# Patient Record
Sex: FEMALE | Race: WHITE | NOT HISPANIC OR LATINO | URBAN - METROPOLITAN AREA
[De-identification: names, ages, dates, MRNs, and addresses within clinical notes are randomized per-mention and may not be internally consistent; named-entity substitution may affect disease eponyms.]

---

## 2020-07-25 ENCOUNTER — TELEPHONE ENCOUNTER (OUTPATIENT)
Dept: URBAN - METROPOLITAN AREA CLINIC 13 | Facility: CLINIC | Age: 70
End: 2020-07-25

## 2020-07-25 RX ORDER — POLYETHYLENE GLYCOL 3350, SODIUM SULFATE, SODIUM CHLORIDE, POTASSIUM CHLORIDE, ASCORBIC ACID, SODIUM ASCORBATE 7.5-2.691G
TAKE 32 OZ AS DIRECTED 5:00PM THE EVENING BEFORE AND 6HR PRIOR TO PROCEDURE KIT ORAL
Qty: 1 | Refills: 0 | OUTPATIENT
Start: 2016-09-19 | End: 2016-09-29

## 2020-07-26 ENCOUNTER — TELEPHONE ENCOUNTER (OUTPATIENT)
Dept: URBAN - METROPOLITAN AREA CLINIC 13 | Facility: CLINIC | Age: 70
End: 2020-07-26

## 2020-07-26 RX ORDER — CALCIUM CITRATE/VITAMIN D3 200MG-6.25
TAKE 1 TABLET DAILY UNSURE OF DOSAGE TABLET ORAL
Refills: 0 | Status: ACTIVE | COMMUNITY

## 2020-07-26 RX ORDER — MULTIVIT-MIN/FOLIC/VIT K/LYCOP 400-300MCG
TAKE 1 TABLET DAILY TABLET ORAL
Refills: 0 | Status: ACTIVE | COMMUNITY
Start: 2016-06-24

## 2020-07-26 RX ORDER — FOLIC ACID 0.8 MG
TAKE 1 CAPSULE DAILY TABLET ORAL
Refills: 0 | Status: ACTIVE | COMMUNITY
Start: 2016-06-24

## 2020-07-26 RX ORDER — BISOPROLOL FUMARATE 5 MG/1
TAKE 1/2 TAB EVERY AM AND 1 TAB EVERY PM TABLET, FILM COATED ORAL
Refills: 0 | Status: ACTIVE | COMMUNITY
Start: 2016-06-24

## 2020-07-26 RX ORDER — PERPHENAZINE/AMITRIPTYLINE HCL 2 MG-25 MG
TAKE 6 TABLET WEEKLY 1TAB SUN,MON,WED,FRI ;  2 TABS TUES AND THURS TABLET ORAL
Refills: 0 | Status: ACTIVE | COMMUNITY
Start: 2016-06-24

## 2020-07-26 RX ORDER — MULTIVITAMIN
TAKE 1 TABLET DAILY TABLET ORAL
Refills: 0 | Status: ACTIVE | COMMUNITY
Start: 2016-06-24

## 2020-07-26 RX ORDER — SIMVASTATIN 5 MG/1
TAKE 1 TABLET AT BEDTIME TABLET, FILM COATED ORAL
Refills: 0 | Status: ACTIVE | COMMUNITY
Start: 2016-06-24

## 2020-07-26 RX ORDER — ESTRADIOL 0.05 MG/D
APPLY 1 PATCH TWICE WEEKLY AS DIRECTED PATCH, EXTENDED RELEASE TRANSDERMAL
Refills: 0 | Status: ACTIVE | COMMUNITY
Start: 2016-06-24

## 2020-07-26 RX ORDER — OMEGA-3/DHA/EPA/FISH OIL 1200 MG
TAKE 1 CAPSULE TWICE DAILY CAPSULE ORAL
Refills: 0 | Status: ACTIVE | COMMUNITY
Start: 2016-06-24

## 2023-11-30 ENCOUNTER — LAB OUTSIDE AN ENCOUNTER (OUTPATIENT)
Dept: URBAN - METROPOLITAN AREA CLINIC 113 | Facility: CLINIC | Age: 73
End: 2023-11-30

## 2023-11-30 ENCOUNTER — OFFICE VISIT (OUTPATIENT)
Dept: URBAN - METROPOLITAN AREA CLINIC 113 | Facility: CLINIC | Age: 73
End: 2023-11-30
Payer: MEDICARE

## 2023-11-30 VITALS
DIASTOLIC BLOOD PRESSURE: 78 MMHG | WEIGHT: 174 LBS | HEART RATE: 75 BPM | TEMPERATURE: 97.6 F | BODY MASS INDEX: 24.91 KG/M2 | RESPIRATION RATE: 16 BRPM | HEIGHT: 70 IN | SYSTOLIC BLOOD PRESSURE: 132 MMHG

## 2023-11-30 DIAGNOSIS — R19.4 CHANGE IN BOWEL HABITS: ICD-10-CM

## 2023-11-30 DIAGNOSIS — R10.84 GENERALIZED ABDOMINAL PAIN: ICD-10-CM

## 2023-11-30 PROCEDURE — 99205 OFFICE O/P NEW HI 60 MIN: CPT | Performed by: INTERNAL MEDICINE

## 2023-11-30 RX ORDER — PERPHENAZINE/AMITRIPTYLINE HCL 2 MG-25 MG
TAKE 6 TABLET WEEKLY 1TAB SUN,MON,WED,FRI ;  2 TABS TUES AND THURS TABLET ORAL
Refills: 0 | Status: ON HOLD | COMMUNITY
Start: 2016-06-24

## 2023-11-30 RX ORDER — ESTRADIOL 0.05 MG/D
APPLY 1 PATCH TWICE WEEKLY AS DIRECTED PATCH, EXTENDED RELEASE TRANSDERMAL
Refills: 0 | Status: ACTIVE | COMMUNITY
Start: 2016-06-24

## 2023-11-30 RX ORDER — ROPINIROLE 2 MG/1
1 TABLET TABLET, FILM COATED, EXTENDED RELEASE ORAL ONCE A DAY
Status: ACTIVE | COMMUNITY

## 2023-11-30 RX ORDER — BISOPROLOL FUMARATE 5 MG/1
TAKE 1/2 TAB EVERY AM AND 1 TAB EVERY PM TABLET, FILM COATED ORAL
Refills: 0 | Status: ACTIVE | COMMUNITY
Start: 2016-06-24

## 2023-11-30 RX ORDER — MONTELUKAST SODIUM 10 MG/1
1 TABLET TABLET, FILM COATED ORAL ONCE A DAY
Status: ACTIVE | COMMUNITY

## 2023-11-30 RX ORDER — FOLIC ACID 0.8 MG
TAKE 1 CAPSULE DAILY TABLET ORAL
Refills: 0 | Status: ACTIVE | COMMUNITY
Start: 2016-06-24

## 2023-11-30 RX ORDER — SIMVASTATIN 5 MG/1
TAKE 1 TABLET AT BEDTIME TABLET, FILM COATED ORAL
Refills: 0 | Status: ACTIVE | COMMUNITY
Start: 2016-06-24

## 2023-11-30 RX ORDER — MULTIVITAMIN
TAKE 1 TABLET DAILY TABLET ORAL
Refills: 0 | Status: ACTIVE | COMMUNITY
Start: 2016-06-24

## 2023-11-30 RX ORDER — CALCIUM CITRATE/VITAMIN D3 200MG-6.25
TAKE 1 TABLET DAILY UNSURE OF DOSAGE TABLET ORAL
Refills: 0 | Status: ACTIVE | COMMUNITY

## 2023-11-30 RX ORDER — MULTIVIT-MIN/FOLIC/VIT K/LYCOP 400-300MCG
TAKE 1 TABLET DAILY TABLET ORAL
Refills: 0 | Status: ACTIVE | COMMUNITY
Start: 2016-06-24

## 2023-11-30 RX ORDER — OMEGA-3/DHA/EPA/FISH OIL 1200 MG
TAKE 1 CAPSULE TWICE DAILY CAPSULE ORAL
Refills: 0 | Status: ACTIVE | COMMUNITY
Start: 2016-06-24

## 2023-11-30 NOTE — HPI-TODAY'S VISIT:
Elif Harvey is a 73-year-old female who presents as a new patient with a plethora of complaints.  I had a chance to review the results of some of the information sent by Dr. Ramos. The patient is a circuitous historian and the history is somewhat difficult to follow.  She dates the beginning of her symptoms to the time she got COVID initially a year and a half ago.  She had "long COVID" symptoms after that.  She had symptoms of bowel irregularity, headache, fatigue, a chronic cough, etc.  She then got COVID a second time in July 2023, and had a persistent cough after that, as well as worsening headaches for which she was treated with amitriptyline, nortriptyline, and a number of other medications.  The patient had a COVID-vaccine administered on October 21, 2023, and she tells me that she "felt fine" before that vaccine was given.  After that, she had worsening diarrhea which lasted for weeks.  She also had a decrease in appetite, and was "not eating much."  She typically would have loose stools in the morning, usually after breakfast, followed by semiformed stools later in the day.  The symptoms are associated with gurgling abdominal discomfort, and decreased appetite.  The patient had occasional regurgitation after eating, and ongoing fatigue complaints.  The symptoms have gradually gotten better since the vaccination, she now says 1 loose stool in the morning, with semiformed stools throughout the day with some associated fecal incontinence.  This still represents a change in bowel habits for her, as she is having 4-5 bowel movements per day which is more than her baseline of 1-2 bowel movements per day prior to her first bout of COVID a year and a half ago.  She has abdominal pain during her bowel movements and up for about a half an hour afterwards.  She has had 7 to 8 pounds of unintentional weight loss since that COVID vaccination in October 2023.  She has some questionable blood in her stool 10 days ago.  Her last colonoscopy was done by Dr. Preciado on September 29, 2016.  This showed a 6 mm rectosigmoid polyp which was histologically hyperplastic as well as internal hemorrhoids.  No diverticula were seen.  The patient was evaluated by Dr. Ramos for a number of different potential organic etiologies, with iron studies being done showing an iron saturation of 40%, a ferritin of 165, TSH of 1.91, a negative urinalysis, sodium 139, potassium 4.2, chloride of 102, bicarb 30, calcium 9.2, total protein 7.0, albumin 4.1, total bilirubin 0.6, alkaline phosphatase 50, AST 25, ALT 25, white blood cell count 4200, hemoglobin 14.6, hematocrit 43.7, and a platelet count of 161,000.  She tells me that she had testing done for rheumatoid arthritis, but I do not see any evidence of this.  Interestingly, the patient tells me that she had a post viral illness when she was a teenager after getting mono which lasted for several months. The patient also had some pink-tinged urine today x2.  This is a new phenomenon.  She denies any dysuria. There is no family history of Crohn's disease, ulcer colitis, etc.  There is apparently a family history of celiac sprue.

## 2023-11-30 NOTE — EXAM-FUNCTIONAL ASSESSMENT
General--no acute distress Eyes--anicteric HENT--normocephalic, atraumatic head Neck--no lymphadenopathy Chest--normal breath sounds Heart--regular rate and rhythm Abdomen--soft, non tender, non distended, bowel sounds present Musculoskeletal--normal gait and station Skin--no rashes Neurologic--Alert and oriented x 3 Psychiatric--stable mood, appropriate affect

## 2023-12-01 LAB
A/G RATIO: 1.7
ABSOLUTE BASOPHILS: 52
ABSOLUTE EOSINOPHILS: 94
ABSOLUTE LYMPHOCYTES: 1841
ABSOLUTE MONOCYTES: 614
ABSOLUTE NEUTROPHILS: 7800
ALBUMIN: 4.1
ALKALINE PHOSPHATASE: 52
ALT (SGPT): 13
AST (SGOT): 13
BASOPHILS: 0.5
BILIRUBIN, TOTAL: 0.5
BUN/CREATININE RATIO: (no result)
BUN: 14
C-REACTIVE PROTEIN, QUANT: 23.9
CALCIUM: 9.4
CARBON DIOXIDE, TOTAL: 29
CHLORIDE: 101
CREATININE: 0.78
EGFR: 81
EOSINOPHILS: 0.9
GLOBULIN, TOTAL: 2.4
GLUCOSE: 86
HEMATOCRIT: 39.5
HEMOGLOBIN: 13.5
IMMUNOGLOBULIN A: 118
LYMPHOCYTES: 17.7
MCH: 30.6
MCHC: 34.2
MCV: 89.6
MONOCYTES: 5.9
MPV: 11.5
NEUTROPHILS: 75
PLATELET COUNT: 173
POTASSIUM: 3.8
PROTEIN, TOTAL: 6.5
RDW: 12.2
RED BLOOD CELL COUNT: 4.41
SED RATE BY MODIFIED: 14
SODIUM: 139
TISSUE TRANSGLUTAMINASE AB, IGA: <1
WHITE BLOOD CELL COUNT: 10.4

## 2023-12-04 ENCOUNTER — TELEPHONE ENCOUNTER (OUTPATIENT)
Dept: URBAN - METROPOLITAN AREA CLINIC 113 | Facility: CLINIC | Age: 73
End: 2023-12-04

## 2023-12-11 ENCOUNTER — TELEPHONE ENCOUNTER (OUTPATIENT)
Dept: URBAN - METROPOLITAN AREA CLINIC 113 | Facility: CLINIC | Age: 73
End: 2023-12-11

## 2023-12-13 ENCOUNTER — TELEPHONE ENCOUNTER (OUTPATIENT)
Dept: URBAN - METROPOLITAN AREA CLINIC 113 | Facility: CLINIC | Age: 73
End: 2023-12-13

## 2023-12-18 LAB
APPEARANCE: CLEAR
BACTERIA: (no result)
BILIRUBIN: NEGATIVE
C-REACTIVE PROTEIN, QUANT: 2.5
GLUCOSE: NEGATIVE
HYALINE CAST: (no result)
KETONES: (no result)
NITRITE, URINE: NEGATIVE
OCCULT BLOOD: NEGATIVE
PH: 6.5
PROTEIN: NEGATIVE
RBC: (no result)
SED RATE BY MODIFIED: 6
SPECIFIC GRAVITY: 1.02
SQUAMOUS EPITHELIAL CELLS: (no result)
URINE-COLOR: YELLOW
WBC ESTERASE: NEGATIVE
WBC: (no result)

## 2024-01-09 ENCOUNTER — OFFICE VISIT (OUTPATIENT)
Dept: URBAN - METROPOLITAN AREA CLINIC 113 | Facility: CLINIC | Age: 74
End: 2024-01-09

## 2024-02-08 ENCOUNTER — COLON (OUTPATIENT)
Dept: URBAN - METROPOLITAN AREA SURGERY CENTER 25 | Facility: SURGERY CENTER | Age: 74
End: 2024-02-08
Payer: MEDICARE

## 2024-02-08 ENCOUNTER — LAB (OUTPATIENT)
Dept: URBAN - METROPOLITAN AREA CLINIC 4 | Facility: CLINIC | Age: 74
End: 2024-02-08
Payer: MEDICARE

## 2024-02-08 DIAGNOSIS — K63.89 OTHER SPECIFIED DISEASES OF INTESTINE: ICD-10-CM

## 2024-02-08 DIAGNOSIS — R10.84 ABDOMINAL CRAMPING, GENERALIZED: ICD-10-CM

## 2024-02-08 DIAGNOSIS — R19.7 ACUTE DIARRHEA: ICD-10-CM

## 2024-02-08 PROCEDURE — 45380 COLONOSCOPY AND BIOPSY: CPT | Performed by: INTERNAL MEDICINE

## 2024-02-08 PROCEDURE — 88313 SPECIAL STAINS GROUP 2: CPT | Performed by: PATHOLOGY

## 2024-02-08 PROCEDURE — 88342 IMHCHEM/IMCYTCHM 1ST ANTB: CPT | Performed by: PATHOLOGY

## 2024-02-08 PROCEDURE — 88305 TISSUE EXAM BY PATHOLOGIST: CPT | Performed by: PATHOLOGY

## 2024-02-08 RX ORDER — PERPHENAZINE/AMITRIPTYLINE HCL 2 MG-25 MG
TAKE 6 TABLET WEEKLY 1TAB SUN,MON,WED,FRI ;  2 TABS TUES AND THURS TABLET ORAL
Refills: 0 | Status: ON HOLD | COMMUNITY
Start: 2016-06-24

## 2024-02-08 RX ORDER — BISOPROLOL FUMARATE 5 MG/1
TAKE 1/2 TAB EVERY AM AND 1 TAB EVERY PM TABLET, FILM COATED ORAL
Refills: 0 | Status: ACTIVE | COMMUNITY
Start: 2016-06-24

## 2024-02-08 RX ORDER — ROPINIROLE 2 MG/1
1 TABLET TABLET, FILM COATED, EXTENDED RELEASE ORAL ONCE A DAY
Status: ACTIVE | COMMUNITY

## 2024-02-08 RX ORDER — CALCIUM CITRATE/VITAMIN D3 200MG-6.25
TAKE 1 TABLET DAILY UNSURE OF DOSAGE TABLET ORAL
Refills: 0 | Status: ACTIVE | COMMUNITY

## 2024-02-08 RX ORDER — SIMVASTATIN 5 MG/1
TAKE 1 TABLET AT BEDTIME TABLET, FILM COATED ORAL
Refills: 0 | Status: ACTIVE | COMMUNITY
Start: 2016-06-24

## 2024-02-08 RX ORDER — MULTIVITAMIN
TAKE 1 TABLET DAILY TABLET ORAL
Refills: 0 | Status: ACTIVE | COMMUNITY
Start: 2016-06-24

## 2024-02-08 RX ORDER — FOLIC ACID 0.8 MG
TAKE 1 CAPSULE DAILY TABLET ORAL
Refills: 0 | Status: ACTIVE | COMMUNITY
Start: 2016-06-24

## 2024-02-08 RX ORDER — MONTELUKAST SODIUM 10 MG/1
1 TABLET TABLET, FILM COATED ORAL ONCE A DAY
Status: ACTIVE | COMMUNITY

## 2024-02-08 RX ORDER — MULTIVIT-MIN/FOLIC/VIT K/LYCOP 400-300MCG
TAKE 1 TABLET DAILY TABLET ORAL
Refills: 0 | Status: ACTIVE | COMMUNITY
Start: 2016-06-24

## 2024-02-08 RX ORDER — OMEGA-3/DHA/EPA/FISH OIL 1200 MG
TAKE 1 CAPSULE TWICE DAILY CAPSULE ORAL
Refills: 0 | Status: ACTIVE | COMMUNITY
Start: 2016-06-24

## 2024-02-08 RX ORDER — ESTRADIOL 0.05 MG/D
APPLY 1 PATCH TWICE WEEKLY AS DIRECTED PATCH, EXTENDED RELEASE TRANSDERMAL
Refills: 0 | Status: ACTIVE | COMMUNITY
Start: 2016-06-24

## 2024-03-08 ENCOUNTER — LAB (OUTPATIENT)
Dept: URBAN - METROPOLITAN AREA CLINIC 113 | Facility: CLINIC | Age: 74
End: 2024-03-08

## 2024-03-08 ENCOUNTER — OV EP (OUTPATIENT)
Dept: URBAN - METROPOLITAN AREA CLINIC 113 | Facility: CLINIC | Age: 74
End: 2024-03-08
Payer: MEDICARE

## 2024-03-08 VITALS
WEIGHT: 177 LBS | SYSTOLIC BLOOD PRESSURE: 138 MMHG | HEIGHT: 70 IN | BODY MASS INDEX: 25.34 KG/M2 | TEMPERATURE: 98.6 F | HEART RATE: 76 BPM | DIASTOLIC BLOOD PRESSURE: 74 MMHG | RESPIRATION RATE: 16 BRPM

## 2024-03-08 DIAGNOSIS — R10.84 GENERALIZED ABDOMINAL PAIN: ICD-10-CM

## 2024-03-08 DIAGNOSIS — R19.4 CHANGE IN BOWEL HABITS: ICD-10-CM

## 2024-03-08 DIAGNOSIS — R11.0 NAUSEA: ICD-10-CM

## 2024-03-08 PROCEDURE — 99214 OFFICE O/P EST MOD 30 MIN: CPT | Performed by: INTERNAL MEDICINE

## 2024-03-08 RX ORDER — CALCIUM CITRATE/VITAMIN D3 200MG-6.25
TAKE 1 TABLET DAILY UNSURE OF DOSAGE TABLET ORAL
Refills: 0 | Status: ACTIVE | COMMUNITY

## 2024-03-08 RX ORDER — BISOPROLOL FUMARATE 5 MG/1
TAKE 1/2 TAB EVERY AM AND 1 TAB EVERY PM TABLET, FILM COATED ORAL
Refills: 0 | Status: ACTIVE | COMMUNITY
Start: 2016-06-24

## 2024-03-08 RX ORDER — SIMVASTATIN 5 MG/1
TAKE 1 TABLET AT BEDTIME TABLET, FILM COATED ORAL
Refills: 0 | Status: ACTIVE | COMMUNITY
Start: 2016-06-24

## 2024-03-08 RX ORDER — PERPHENAZINE/AMITRIPTYLINE HCL 2 MG-25 MG
TAKE 6 TABLET WEEKLY 1TAB SUN,MON,WED,FRI ;  2 TABS TUES AND THURS TABLET ORAL
Refills: 0 | Status: ON HOLD | COMMUNITY
Start: 2016-06-24

## 2024-03-08 RX ORDER — MONTELUKAST SODIUM 10 MG/1
1 TABLET TABLET, FILM COATED ORAL ONCE A DAY
Status: ACTIVE | COMMUNITY

## 2024-03-08 RX ORDER — ESTRADIOL 0.05 MG/D
APPLY 1 PATCH TWICE WEEKLY AS DIRECTED PATCH, EXTENDED RELEASE TRANSDERMAL
Refills: 0 | Status: ACTIVE | COMMUNITY
Start: 2016-06-24

## 2024-03-08 RX ORDER — MULTIVITAMIN
TAKE 1 TABLET DAILY TABLET ORAL
Refills: 0 | Status: ACTIVE | COMMUNITY
Start: 2016-06-24

## 2024-03-08 RX ORDER — FOLIC ACID 0.8 MG
TAKE 1 CAPSULE DAILY TABLET ORAL
Refills: 0 | Status: ACTIVE | COMMUNITY
Start: 2016-06-24

## 2024-03-08 RX ORDER — MULTIVIT-MIN/FOLIC/VIT K/LYCOP 400-300MCG
TAKE 1 TABLET DAILY TABLET ORAL
Refills: 0 | Status: ACTIVE | COMMUNITY
Start: 2016-06-24

## 2024-03-08 RX ORDER — ROPINIROLE 2 MG/1
1 TABLET TABLET, FILM COATED, EXTENDED RELEASE ORAL ONCE A DAY
Status: ACTIVE | COMMUNITY

## 2024-03-08 RX ORDER — OMEGA-3/DHA/EPA/FISH OIL 1200 MG
TAKE 1 CAPSULE TWICE DAILY CAPSULE ORAL
Refills: 0 | Status: ACTIVE | COMMUNITY
Start: 2016-06-24

## 2024-03-08 NOTE — HPI-TODAY'S VISIT:
Elif Harvey is a 73-year-old female who presents as a new patient with a plethora of complaints.  I had a chance to review the results of some of the information sent by Dr. Ramos. The patient is a circuitous historian and the history is somewhat difficult to follow.  She dates the beginning of her symptoms to the time she got COVID initially a year and a half ago.  She had "long COVID" symptoms after that.  She had symptoms of bowel irregularity, headache, fatigue, a chronic cough, etc.  She then got COVID a second time in July 2023, and had a persistent cough after that, as well as worsening headaches for which she was treated with amitriptyline, nortriptyline, and a number of other medications.  The patient had a COVID-vaccine administered on October 21, 2023, and she tells me that she "felt fine" before that vaccine was given.  After that, she had worsening diarrhea which lasted for weeks.  She also had a decrease in appetite, and was "not eating much."  She typically would have loose stools in the morning, usually after breakfast, followed by semiformed stools later in the day.  The symptoms are associated with gurgling abdominal discomfort, and decreased appetite.  The patient had occasional regurgitation after eating, and ongoing fatigue complaints.  The symptoms have gradually gotten better since the vaccination, she now says 1 loose stool in the morning, with semiformed stools throughout the day with some associated fecal incontinence.  This still represents a change in bowel habits for her, as she is having 4-5 bowel movements per day which is more than her baseline of 1-2 bowel movements per day prior to her first bout of COVID a year and a half ago.  She has abdominal pain during her bowel movements and up for about a half an hour afterwards.  She has had 7 to 8 pounds of unintentional weight loss since that COVID vaccination in October 2023.  She has some questionable blood in her stool 10 days ago.  Her last colonoscopy was done by Dr. Preciado on September 29, 2016.  This showed a 6 mm rectosigmoid polyp which was histologically hyperplastic as well as internal hemorrhoids.  No diverticula were seen.  The patient was evaluated by Dr. Ramos for a number of different potential organic etiologies, with iron studies being done showing an iron saturation of 40%, a ferritin of 165, TSH of 1.91, a negative urinalysis, sodium 139, potassium 4.2, chloride of 102, bicarb 30, calcium 9.2, total protein 7.0, albumin 4.1, total bilirubin 0.6, alkaline phosphatase 50, AST 25, ALT 25, white blood cell count 4200, hemoglobin 14.6, hematocrit 43.7, and a platelet count of 161,000.  She tells me that she had testing done for rheumatoid arthritis, but I do not see any evidence of this.  Interestingly, the patient had a post viral illness when she was a teenager after getting mono which lasted for several months.  The patient also had some pink-tinged urine x2.  This was a new phenomenon.  She denied any dysuria.  There is no family history of Crohn's disease, ulcerative colitis, etc.  There is apparently a family history of celiac sprue.  After her last visit, she had a number of labs checked including a celiac panel, which was negative, a CBC showing a white blood cell count of 10,400, hemoglobin 13.5, hematocrit 39.5%, and a platelet count of 173,000.  Her complete metabolic panel was normal.  C-reactive protein was initially elevated on December 1, 2023 at 23.9, but a recheck value on December 13, 2023 was normal at 2.5.  Sedimentation rate was normal at 6.  Urinalysis was negative.  The patient had a CT scan of the abdomen and pelvis done on December 7, 2023 which was normal.    A colonoscopy done to February 8, 2024 showed left colon diverticulosis and grade 1 internal hemorrhoids but was otherwise negative.  Random colon biopsies were negative for any pathology.  She returns today for follow-up.  She was taking Sinfulair, but this was discontinued in early Janaury 2024. Her symptoms overall improved greatly after that medication was d/c'd.  The patient feels much better now.  She has a little nausea daily, and is "not very hungry" but is getting better overall.  Diarrhea has largely resolved.  She denies any fever, chills, or sweats or abdominal pain.  She has gained weight.    Nausea is her primary current concern.

## 2024-05-15 ENCOUNTER — OFFICE VISIT (OUTPATIENT)
Dept: URBAN - METROPOLITAN AREA SURGERY CENTER 25 | Facility: SURGERY CENTER | Age: 74
End: 2024-05-15